# Patient Record
Sex: MALE | Race: WHITE | NOT HISPANIC OR LATINO | URBAN - METROPOLITAN AREA
[De-identification: names, ages, dates, MRNs, and addresses within clinical notes are randomized per-mention and may not be internally consistent; named-entity substitution may affect disease eponyms.]

---

## 2019-03-22 ENCOUNTER — AMBULATORY - HEALTHEAST (OUTPATIENT)
Dept: CARDIAC REHAB | Facility: CLINIC | Age: 49
End: 2019-03-22

## 2019-03-22 DIAGNOSIS — Z95.5 S/P CORONARY ARTERY STENT PLACEMENT: ICD-10-CM

## 2019-03-22 DIAGNOSIS — I21.3 STEMI (ST ELEVATION MYOCARDIAL INFARCTION) (H): ICD-10-CM

## 2019-03-28 ENCOUNTER — AMBULATORY - HEALTHEAST (OUTPATIENT)
Dept: CARDIAC REHAB | Facility: CLINIC | Age: 49
End: 2019-03-28

## 2019-03-28 ENCOUNTER — COMMUNICATION - HEALTHEAST (OUTPATIENT)
Dept: TELEHEALTH | Facility: CLINIC | Age: 49
End: 2019-03-28

## 2019-03-28 DIAGNOSIS — Z95.5 S/P CORONARY ARTERY STENT PLACEMENT: ICD-10-CM

## 2019-03-28 DIAGNOSIS — I21.3 STEMI (ST ELEVATION MYOCARDIAL INFARCTION) (H): ICD-10-CM

## 2019-03-28 RX ORDER — METOPROLOL TARTRATE 25 MG/1
12.5 TABLET, FILM COATED ORAL 2 TIMES DAILY
Status: SHIPPED | COMMUNITY
Start: 2019-03-28

## 2019-03-28 RX ORDER — IBUPROFEN 200 MG
200 TABLET ORAL EVERY 6 HOURS PRN
Status: SHIPPED | COMMUNITY
Start: 2019-03-28

## 2019-03-28 RX ORDER — NICOTINE 21 MG/24HR
1 PATCH, TRANSDERMAL 24 HOURS TRANSDERMAL EVERY 24 HOURS
Status: SHIPPED | COMMUNITY
Start: 2019-03-28

## 2019-03-28 RX ORDER — ISOSORBIDE MONONITRATE 30 MG/1
30 TABLET, EXTENDED RELEASE ORAL DAILY
Status: SHIPPED | COMMUNITY
Start: 2019-03-28

## 2019-03-28 RX ORDER — NITROGLYCERIN 0.4 MG/1
0.4 TABLET SUBLINGUAL EVERY 5 MIN PRN
Status: SHIPPED | COMMUNITY
Start: 2019-03-28

## 2019-03-28 RX ORDER — ATORVASTATIN CALCIUM 40 MG/1
40 TABLET, FILM COATED ORAL AT BEDTIME
Status: SHIPPED | COMMUNITY
Start: 2019-03-28

## 2019-03-28 RX ORDER — LISINOPRIL 2.5 MG/1
2.5 TABLET ORAL DAILY
Status: SHIPPED | COMMUNITY
Start: 2019-03-28

## 2019-04-01 ENCOUNTER — AMBULATORY - HEALTHEAST (OUTPATIENT)
Dept: CARDIAC REHAB | Facility: CLINIC | Age: 49
End: 2019-04-01

## 2019-04-01 DIAGNOSIS — I21.02 ST ELEVATION MYOCARDIAL INFARCTION INVOLVING LEFT ANTERIOR DESCENDING (LAD) CORONARY ARTERY (H): ICD-10-CM

## 2019-04-03 ENCOUNTER — AMBULATORY - HEALTHEAST (OUTPATIENT)
Dept: CARDIAC REHAB | Facility: CLINIC | Age: 49
End: 2019-04-03

## 2019-04-03 DIAGNOSIS — I21.02 ST ELEVATION MYOCARDIAL INFARCTION INVOLVING LEFT ANTERIOR DESCENDING (LAD) CORONARY ARTERY (H): ICD-10-CM

## 2019-04-08 ENCOUNTER — AMBULATORY - HEALTHEAST (OUTPATIENT)
Dept: CARDIAC REHAB | Facility: CLINIC | Age: 49
End: 2019-04-08

## 2019-04-08 DIAGNOSIS — I21.02 ST ELEVATION MYOCARDIAL INFARCTION INVOLVING LEFT ANTERIOR DESCENDING (LAD) CORONARY ARTERY (H): ICD-10-CM

## 2019-04-08 DIAGNOSIS — Z95.5 S/P CORONARY ARTERY STENT PLACEMENT: ICD-10-CM

## 2019-04-15 ENCOUNTER — AMBULATORY - HEALTHEAST (OUTPATIENT)
Dept: CARDIAC REHAB | Facility: CLINIC | Age: 49
End: 2019-04-15

## 2019-04-15 DIAGNOSIS — I21.02 ST ELEVATION MYOCARDIAL INFARCTION INVOLVING LEFT ANTERIOR DESCENDING (LAD) CORONARY ARTERY (H): ICD-10-CM

## 2019-04-15 DIAGNOSIS — Z95.5 S/P CORONARY ARTERY STENT PLACEMENT: ICD-10-CM

## 2019-04-19 ENCOUNTER — AMBULATORY - HEALTHEAST (OUTPATIENT)
Dept: CARDIAC REHAB | Facility: CLINIC | Age: 49
End: 2019-04-19

## 2019-04-19 DIAGNOSIS — I21.02 ST ELEVATION MYOCARDIAL INFARCTION INVOLVING LEFT ANTERIOR DESCENDING (LAD) CORONARY ARTERY (H): ICD-10-CM

## 2019-04-19 DIAGNOSIS — Z95.5 S/P CORONARY ARTERY STENT PLACEMENT: ICD-10-CM

## 2021-05-27 NOTE — PROGRESS NOTES
ITP ASSESSMENT   Assessment Day: Initial    Session Number: 1/2  Precautions: Standard cardiac    Diagnosis: MI;Stent    Risk Stratification: High    Referring Provider: Malcolm Artis MD  EXERCISE  Exercise Assessment: Initial       6 Minute Walk Test   Pre   Pre Exercise HR: 78                    Pre Exercise BP: 118/70      Peak  Peak HR: 101                   Peak BP: 140/80    Peak feet: 1675    Peak O2 SAT: 98    Peak RPE: 11    Peak MPH: 3.17      Symptoms:  Peak Symptoms: none      5 mins. Post  5 Min Post HR: 80    5 Min Post BP: 118/76                           Exercise Plan  Goals Next 30 days  Leisure: Patient will return to [employer] without cardiovacscular signs/symptoms.    Work: Patient will have less SOB with lifting furniture pieces.        Education Goals: All goals in this section met    Education Goals Met: Patient can state cardiac s/s and appropriate emergency response.;Has system for taking medication.;Medication review.      Exercise Prescription  Exercise Mode: Treadmill;Bike;Elliptical;Nustep    Frequency: 3x/week    Duration: 30-40 minutes    Intensity / THR: 20-30 beats above resting heart rate    RPE 11-14  Progression / Met level: 3.0-4.0    Resistive Training?: No      Current Exercise (mins/week): 135      Interventions  Home Exercise:  Mode: TM    Frequency: 3x/week    Duration: 45 minutes      Education Material : Educational videos;Provide written material;Individual education and counseling      Education Completed  Exercise Education Completed: Cardiac Anatomy;Signs and Symptoms;Medication review;RPE;Emergency Plan;Home Exercise;Warm up/cool down;BP/HR Reponse to exercise;Benefits of Exercise;End point of exercise              Exercise Follow-up/Discharge  Follow up/Discharge: Patient is walking on a treadmill for 45 minutes 3x/week.   NUTRITION  Nutrition Assessment: Initial      Nutrition Risk Factors:  Nutrition Risk Factors: Dyslipidemia;Overweight  Cholesterol: 261  "(3/4/19)  LDL: 180  HDL: 36  Triglycerides: 227      Nutrition Plan  Interventions  Other Nutrition Intervention: Diet Class;Therapist/Pt Discussion;Educational Videos;Provide with Written Material      Education Completed  Nutrition Education Completed: Low Saturated fat diet;Risk factor overview;Low sodium diet      Goals  Nutrition Goals (Next 30 days): Patient can identify their risk factors for CAD;Patient will lose weight;Patient will follow a low saturated fat diet      Goals Met  Nutrition Goals Met: Patient follows a low sodium diet      Height, Weight, and  BMI  Weight: 256 lb 3.2 oz (116.2 kg)  Height: 6' 3\" (1.905 m)  BMI: 32.02      Nutrition Follow-up  Follow-up/Discharge: Patient has changed his eating since his MI. Patient is eating chicken and turkey and is concentrating on increasing vegetables and fruit.  Patient does not add salt to foods. Patient has decreased soda intake from 8-10 cans/day to 1-2/day.         Other Risk Factors  Other Risk Factor Assessment: Initial      HTN Risk Factor: NA      Pre Exercise BP: 118/70  Post Exercise BP: 118/76      Hypertension Plan  Goals      Tobacco Risk Factor: Tobacco      Initial Use:: 2 PPD  Current Use:: 3-4 cigs/day      Tobacco Plan  Tobacco Goals  Tobacco Goals: Patient to reduce tobacco use (see comments for amount)      Goals Met  Tobacco Goals Met: Patient to reduce tobacco use (see comments for amount)      Tobacco Interventions  Tobacco Interventions: Therapist/patient discussion      Tobacco Education Completed  Tobacco Education Completed: Identifies triggers;Health benefits of tobacco cessation;Risk factor overview      Risk Factor Follow-up   Follow-up/Discharge: Patient lives with his sister and she smokes.  This is the biggest obstacle. Pt does not smoke at work.  Patient's sister is trying to quit also.     PSYCHOSOCIAL  Psychosocial Assessment: Initial       [facility] Q of L Summary Score: 16      MÓNICA-D Score: 0      Psychosocial " Risk Factor: Stress      Psychosocial Plan  Interventions  If MÓNICA-D > 15 send letter to MD  Interventions: Provide written material;Individual education and counseling      Education Completed  Education Completed: Effects of stress on body      Goals  Goals (Next 30 days): Identify stressors      Goals Met  Goals Met: Identified Support system      Psychosocial Follow-up  Follow-up/Discharge: Patient processes stress internally and then talks about it. Patient really enjoys spending time with his grandchildren.             Patient involved in Goal setting?: Yes      Signature: _____________________________________________________________    Date: __________________    Time: __________________

## 2021-05-27 NOTE — PROGRESS NOTES
Cardiac Rehab  Phase II Assessment    Assessment Date: 3/28/19    Diagnosis: STEMI  Date of Onset: 3/3/19  Procedure: TOSIN x 1 ostial to pLAD, POBA apical LAD and small RI  Date of Onset: 3/3/19  ICD/Pacemaker: No Parameters: NA  Post-op Complications: none  ECG History: SR with RBBB EF%:55%   Past Medical History: hernia surgery    Physical Assessment  Precautions/ Physical Limitations: none  Oxygen: No  O2 Sats: 97% Lung Sounds: clear Edema: none  Incisions: NA  Sleeping Pattern: good   Appetite: good   Nutrition Risk Screen: completed, no needs at this time    Pain  Location: NA  Characteristics:NA  Intensity: (0-10 scale) 0  Current Pain Management: NA  Intervention: NA  Response: NA    Psychosocial/ Emotional Health  1. In the past 12 months, have you been in a relationship where you have been abused physically, emotionally, sexually or financially? No  notified: NA  2. Who do you turn to for emotional support?: sisters, son, daughter  3. Do you have cultural or spiritual needs? No  4. Have there been any major life changes in the past 12 months? Yes Father  2018, sister was diagnosed with lung cancer 2018 and dog passed away 2019    Referral Information  Primary Physician: working on establishing care  Cardiologist: Vivienne Escobar CNP  Surgeon: WAQAS    Home exercise/Equipment: apartment building has a gym    Patient's long-term goal(s): To lose weight and quit smoking    1. Living Accommodations: Apartment Steps: No      Support people at home: sister  3. Family is able to assist with cares      Yarsani/Community involvement: NA  4. Recreation/Hobbies: golf, softball

## 2021-05-28 ENCOUNTER — RECORDS - HEALTHEAST (OUTPATIENT)
Dept: ADMINISTRATIVE | Facility: CLINIC | Age: 51
End: 2021-05-28

## 2021-05-28 NOTE — PROGRESS NOTES
ITP ASSESSMENT   Assessment Day: 60 Day    Session Number: 7  Precautions: cardiac    Diagnosis: MI;Stent    Risk Stratification: Low    Referring Provider: Malcolm Artis MD  EXERCISE  Exercise Assessment: Discharge       6 Minute Walk Test   Pre   Pre Exercise HR: 78                    Pre Exercise BP: 118/70      Peak  Peak HR: 101                   Peak BP: 140/80    Peak feet: 1675    Peak O2 SAT: 98    Peak RPE: 11    Peak MPH: 3.17      Symptoms:  Peak Symptoms: none      5 mins. Post  5 Min Post HR: 80    5 Min Post BP: 118/76                           Exercise Plan  Goals Next 30 days  ADL'S: Pt will be more consistent with exercising on the treadmill. Pt will walk on the treadmill 45 minutes 2 days per week.    Leisure: Pt will return to playing softball without any cv signs or symptoms.    Work: Pt will have less SOB with lifting furniture pieces.      Education Goals: All goals in this section met    Education Goals Met: Patient can state cardiac s/s and appropriate emergency response.;Has system for taking medication.;Medication review.                          Goals Met     30 Day Progression: pt did not attend with any regularity so no improvement in goals.      No data recorded  Initial Leisure goals met: Goal met. Pt has returned to golf and is tolerating this well.    Intial Work goals met: Pt still has the same amount of SOB with lifting furniture pieces.    Initial Progression: Pt is making good progress towards his stated goals.      Exercise Prescription  Exercise Mode: Treadmill;Bike;Elliptical;Nustep    Frequency: 3x/week    Duration: 45-50 minutes    Intensity / THR: 20-30 beats above resting heart rate    RPE 11-14  Progression / Met level: 3.2-5.2    Resistive Training?: No      Current Exercise (mins/week): 250      Interventions  Home Exercise:  Mode: TM     Frequency: 2x/week    Duration: 45 minutes      Education Material : Educational videos;Provide written material;Individual  "education and counseling      Education Completed  Exercise Education Completed: Cardiac Anatomy;Signs and Symptoms;Medication review;RPE;Emergency Plan;Home Exercise;Warm up/cool down;BP/HR Reponse to exercise;Benefits of Exercise;End point of exercise              Exercise Follow-up/Discharge  Follow up/Discharge: Pt is walking 45 minutes on a treadmill 2 days per week. However, patient is not always consistently exercising 2 days a week at home.   NUTRITION  Nutrition Assessment: Discharge      Nutrition Risk Factors:  Nutrition Risk Factors: Dyslipidemia;Overweight  Cholesterol: 261  LDL: 180  HDL: 36  Triglycerides: 227      Nutrition Plan  Interventions    Other Nutrition Intervention: Diet Class;Therapist/Pt Discussion;Educational Videos;Provide with Written Material       Education Completed  Nutrition Education Completed: Low Saturated fat diet;Risk factor overview;Low sodium diet      Goals  Nutrition Goals (Next 30 days): Patient can identify their risk factors for CAD;Patient will lose weight;Patient will follow a low saturated fat diet      Goals Met  Nutrition Goals Met: Patient follows a low sodium diet;Patient states following a low saturated fat diet;Patient knows appropriate portion size      Height, Weight, and  BMI  Weight: 253 lb (114.8 kg)  Height: 6' 3\" (1.905 m)  BMI: 31.62      Nutrition Follow-up  Follow-up/Discharge: Pt has RD appt 4/24/18.  Pt continues to follow a heart healthy diet.  Pt is drinking water with meals and is making healthier choices when eating out.         Other Risk Factors  Other Risk Factor Assessment: Discharge      HTN Risk Factor: NA      Pre Exercise BP: 110/54  Post Exercise BP: 110/60      Hypertension Plan  Goals     Tobacco Risk Factor: Tobacco      Initial Use:: 2 PPD  Current Use:: 3-8 cigs/day      Tobacco Plan  Tobacco Goals  Tobacco Goals: Patient to reduce tobacco use (see comments for amount)      Goals Met  Tobacco Goals Met: Patient to reduce tobacco " use (see comments for amount)      Tobacco Interventions  Tobacco Interventions: Therapist/patient discussion      Tobacco Education Completed  Tobacco Education Completed: Identifies triggers;Health benefits of tobacco cessation;Risk factor overview      Risk Factor Follow-up   Follow-up/Discharge: Pt is getting close to setting a quit date.  Pt will be calling PMD about nicotine patch.Pt smokes more depending on who he is with.     PSYCHOSOCIAL  Psychosocial Assessment: Discharge          Psychosocial Risk Factor: Stress      Psychosocial Plan  Interventions  Interventions: Provide written material;Individual education and counseling      Education Completed  Education Completed: Effects of stress on body      Goals  Goals (Next 30 days): Improvement in [employer] COOP score      Goals Met  Goals Met: Identified Support system      Psychosocial Follow-up  Follow-up/Discharge: Patient's biggest stressor is work.  Pt will sit down and relax to help relieve stress. Pt enjoys reading.             Patient involved in Goal setting?: No      Signature: _____________________________________________________________    Date: __________________    Time: __________________

## 2021-05-28 NOTE — PROGRESS NOTES
ITP ASSESSMENT   Assessment Day: 30 Day    Session Number: 6  Precautions: Standard cardiac    Diagnosis: MI;Stent    Risk Stratification: High    Referring Provider: Malcolm Artis MD  EXERCISE  Exercise Assessment: Reassessment       6 Minute Walk Test   Pre   Pre Exercise HR: 78                    Pre Exercise BP: 118/70      Peak  Peak HR: 101                   Peak BP: 140/80    Peak feet: 1675    Peak O2 SAT: 98    Peak RPE: 11    Peak MPH: 3.17      Symptoms:  Peak Symptoms: none      5 mins. Post  5 Min Post HR: 80    5 Min Post BP: 118/76                           Exercise Plan  Goals Next 30 days  ADL'S: Pt will be more consistent with exercising on the treadmill. Pt will walk on the treadmill 45 minutes 2 days per week.    Leisure: Pt will return to playing softball without any cv signs or symptoms.    Work: Pt will have less SOB with lifting furniture pieces.      Education Goals: All goals in this section met    Education Goals Met: Patient can state cardiac s/s and appropriate emergency response.;Has system for taking medication.;Medication review.                          Goals Met  Initial Leisure goals met: Goal met. Pt has returned to golf and is tolerating this well.    Intial Work goals met: Pt still has the same amount of SOB with lifting furniture pieces.    Initial Progression: Pt is making good progress towards his stated goals.      Exercise Prescription  Exercise Mode: Treadmill;Bike;Elliptical;Nustep    Frequency: 3x/week    Duration: 45-50 minutes    Intensity / THR: 20-30 beats above resting heart rate    RPE 11-14  Progression / Met level: 3.2-5.2    Resistive Training?: No      Current Exercise (mins/week): 250      Interventions  Home Exercise:  Mode: TM     Frequency: 2x/week    Duration: 45 minutes      Education Material : Educational videos;Provide written material;Individual education and counseling      Education Completed  Exercise Education Completed: Cardiac  "Anatomy;Signs and Symptoms;Medication review;RPE;Emergency Plan;Home Exercise;Warm up/cool down;BP/HR Reponse to exercise;Benefits of Exercise;End point of exercise              Exercise Follow-up/Discharge  Follow up/Discharge: Pt is walking 45 minutes on a treadmill 2 days per week. However, patient is not always consistently exercising 2 days a week at home.           NUTRITION  Nutrition Assessment: Reassessment      Nutrition Risk Factors:  Nutrition Risk Factors: Dyslipidemia;Overweight  Cholesterol: 261  LDL: 180  HDL: 36  Triglycerides: 227      Nutrition Plan  Interventions  Other Nutrition Intervention: Diet Class;Therapist/Pt Discussion;Educational Videos;Provide with Written Material      Education Completed  Nutrition Education Completed: Low Saturated fat diet;Risk factor overview;Low sodium diet      Goals  Nutrition Goals (Next 30 days): Patient can identify their risk factors for CAD;Patient will lose weight;Patient will follow a low saturated fat diet      Goals Met  Nutrition Goals Met: Patient follows a low sodium diet;Patient states following a low saturated fat diet;Patient knows appropriate portion size      Height, Weight, and  BMI  Weight: 253 lb (114.8 kg)  Height: 6' 3\" (1.905 m)  BMI: 31.62      Nutrition Follow-up  Follow-up/Discharge: Pt has RD appt 4/24/18.  Pt continues to follow a heart healthy diet.  Pt is drinking water with meals and is making healthier choices when eating out.       Other Risk Factors  Other Risk Factor Assessment: Reassessment      HTN Risk Factor: NA      Pre Exercise BP: 110/54  Post Exercise BP: 102/64          Tobacco Risk Factor: Tobacco      Initial Use:: 2 PPD  Current Use:: 3-8 cigs/day      Tobacco Plan  Tobacco Goals  Tobacco Goals: Patient to reduce tobacco use (see comments for amount)      Goals Met  Tobacco Goals Met: Patient to reduce tobacco use (see comments for amount)      Tobacco Interventions  Tobacco Interventions: Therapist/patient " discussion      Tobacco Education Completed  Tobacco Education Completed: Identifies triggers;Health benefits of tobacco cessation;Risk factor overview      Risk Factor Follow-up   Follow-up/Discharge: Pt is getting close to setting a quit date.  Pt will be calling PMD about nicotine patch.Pt smokes more depending on who he is with.         PSYCHOSOCIAL  Psychosocial Assessment: Reassessment       [facility] COOP Q of L Summary Score: 16      MÓNICA-D Score: 0      Psychosocial Risk Factor: Stress      Psychosocial Plan  Interventions  Interventions: Provide written material;Individual education and counseling      Education Completed  Education Completed: Effects of stress on body      Goals  Goals (Next 30 days): Improvement in [employer] COOP score      Goals Met  Goals Met: Identified Support system      Psychosocial Follow-up  Follow-up/Discharge: Patient's biggest stressor is work.  Pt will sit down and relax to help relieve stress. Pt enjoys reading.           Patient involved in Goal setting?: Yes      Signature: _____________________________________________________________    Date: __________________    Time: __________________

## 2021-06-02 VITALS — WEIGHT: 256.2 LBS

## 2021-06-02 VITALS — WEIGHT: 254 LBS

## 2021-06-02 VITALS — WEIGHT: 255 LBS

## 2021-06-03 VITALS — WEIGHT: 253 LBS
